# Patient Record
Sex: FEMALE | Race: BLACK OR AFRICAN AMERICAN | NOT HISPANIC OR LATINO | Employment: UNEMPLOYED | RURAL
[De-identification: names, ages, dates, MRNs, and addresses within clinical notes are randomized per-mention and may not be internally consistent; named-entity substitution may affect disease eponyms.]

---

## 2022-02-22 ENCOUNTER — OFFICE VISIT (OUTPATIENT)
Dept: FAMILY MEDICINE | Facility: CLINIC | Age: 37
End: 2022-02-22
Payer: MEDICARE

## 2022-02-22 VITALS
TEMPERATURE: 98 F | SYSTOLIC BLOOD PRESSURE: 140 MMHG | BODY MASS INDEX: 25.33 KG/M2 | HEIGHT: 69 IN | HEART RATE: 87 BPM | OXYGEN SATURATION: 99 % | DIASTOLIC BLOOD PRESSURE: 81 MMHG | WEIGHT: 171 LBS

## 2022-02-22 DIAGNOSIS — Z12.4 SCREENING FOR MALIGNANT NEOPLASM OF THE CERVIX: ICD-10-CM

## 2022-02-22 DIAGNOSIS — R10.9 ABDOMINAL PAIN, UNSPECIFIED ABDOMINAL LOCATION: ICD-10-CM

## 2022-02-22 DIAGNOSIS — Z01.411 ENCNTR FOR GYN EXAM (GENERAL) (ROUTINE) W ABNORMAL FINDINGS: Primary | ICD-10-CM

## 2022-02-22 DIAGNOSIS — N89.8 VAGINAL DISCHARGE: ICD-10-CM

## 2022-02-22 DIAGNOSIS — F32.A DEPRESSION, UNSPECIFIED DEPRESSION TYPE: ICD-10-CM

## 2022-02-22 LAB
BACTERIA VAG QL WET PREP: ABNORMAL /HPF
CLUE CELLS VAG QL WET PREP: ABNORMAL /HPF
HAV IGM SER QL: ABNORMAL
HBV CORE IGM SER QL: ABNORMAL
HBV SURFACE AG SERPL QL IA: REACTIVE
HCV AB SER QL: ABNORMAL
HIV 1+O+2 AB SERPL QL: NORMAL
RBC #/AREA VAG WET PREP: ABNORMAL /HPF
SQUAMOUS EPITHELIALS WET WOUNT, GENITAL: ABNORMAL /HPF
SYPHILIS AB INTERPRETATION: NORMAL
T VAGINALIS VAG QL WET PREP: ABNORMAL /HPF
WBC CLUMPS WET MOUNT, GENITAL: ABNORMAL /HPF
WBC VAG QL WET PREP: ABNORMAL /HPF
YEAST VAG QL WET PREP: ABNORMAL /HPF

## 2022-02-22 PROCEDURE — 87389 HIV 1 / 2 ANTIBODY: ICD-10-PCS | Mod: ,,, | Performed by: CLINICAL MEDICAL LABORATORY

## 2022-02-22 PROCEDURE — 87389 HIV-1 AG W/HIV-1&-2 AB AG IA: CPT | Mod: ,,, | Performed by: CLINICAL MEDICAL LABORATORY

## 2022-02-22 PROCEDURE — 80074 ACUTE HEPATITIS PANEL: CPT | Mod: ,,, | Performed by: CLINICAL MEDICAL LABORATORY

## 2022-02-22 PROCEDURE — G0101 CA SCREEN;PELVIC/BREAST EXAM: HCPCS | Mod: ,,, | Performed by: NURSE PRACTITIONER

## 2022-02-22 PROCEDURE — 87591 N.GONORRHOEAE DNA AMP PROB: CPT | Mod: ,,, | Performed by: CLINICAL MEDICAL LABORATORY

## 2022-02-22 PROCEDURE — 87661 TRICHOMONAS VAGINALIS BY PCR: ICD-10-PCS | Mod: ,,, | Performed by: CLINICAL MEDICAL LABORATORY

## 2022-02-22 PROCEDURE — 86695 HERPES SIMPLEX 1 & 2 IGG: ICD-10-PCS | Mod: ,,, | Performed by: CLINICAL MEDICAL LABORATORY

## 2022-02-22 PROCEDURE — G0123 SCREEN CERV/VAG THIN LAYER: HCPCS | Mod: GCY | Performed by: NURSE PRACTITIONER

## 2022-02-22 PROCEDURE — 87491 CHLAMYDIA/GONORRHOEAE(GC), PCR: ICD-10-PCS | Mod: ,,, | Performed by: CLINICAL MEDICAL LABORATORY

## 2022-02-22 PROCEDURE — 80074 HEPATITIS PANEL, ACUTE: ICD-10-PCS | Mod: ,,, | Performed by: CLINICAL MEDICAL LABORATORY

## 2022-02-22 PROCEDURE — 86696 HERPES SIMPLEX TYPE 2 TEST: CPT | Mod: ,,, | Performed by: CLINICAL MEDICAL LABORATORY

## 2022-02-22 PROCEDURE — 87661 TRICHOMONAS VAGINALIS AMPLIF: CPT | Mod: ,,, | Performed by: CLINICAL MEDICAL LABORATORY

## 2022-02-22 PROCEDURE — 96372 THER/PROPH/DIAG INJ SC/IM: CPT | Mod: ,,, | Performed by: NURSE PRACTITIONER

## 2022-02-22 PROCEDURE — 86780 TREPONEMA PALLIDUM (SYPHILIS) ANTIBODY: ICD-10-PCS | Mod: ,,, | Performed by: CLINICAL MEDICAL LABORATORY

## 2022-02-22 PROCEDURE — 87210 SMEAR WET MOUNT SALINE/INK: CPT | Mod: 59,,, | Performed by: CLINICAL MEDICAL LABORATORY

## 2022-02-22 PROCEDURE — 87491 CHLMYD TRACH DNA AMP PROBE: CPT | Mod: ,,, | Performed by: CLINICAL MEDICAL LABORATORY

## 2022-02-22 PROCEDURE — 96372 PR INJECTION,THERAP/PROPH/DIAG2ST, IM OR SUBCUT: ICD-10-PCS | Mod: ,,, | Performed by: NURSE PRACTITIONER

## 2022-02-22 PROCEDURE — 87591 CHLAMYDIA/GONORRHOEAE(GC), PCR: ICD-10-PCS | Mod: ,,, | Performed by: CLINICAL MEDICAL LABORATORY

## 2022-02-22 PROCEDURE — 87210 WET PREP, GENITAL: ICD-10-PCS | Mod: 59,,, | Performed by: CLINICAL MEDICAL LABORATORY

## 2022-02-22 PROCEDURE — G0101 PR CA SCREEN;PELVIC/BREAST EXAM: ICD-10-PCS | Mod: ,,, | Performed by: NURSE PRACTITIONER

## 2022-02-22 PROCEDURE — 86695 HERPES SIMPLEX TYPE 1 TEST: CPT | Mod: ,,, | Performed by: CLINICAL MEDICAL LABORATORY

## 2022-02-22 PROCEDURE — 86696 HERPES SIMPLEX 1 & 2 IGG: ICD-10-PCS | Mod: ,,, | Performed by: CLINICAL MEDICAL LABORATORY

## 2022-02-22 PROCEDURE — 86780 TREPONEMA PALLIDUM: CPT | Mod: ,,, | Performed by: CLINICAL MEDICAL LABORATORY

## 2022-02-22 RX ORDER — CEFTRIAXONE 1 G/1
1 INJECTION, POWDER, FOR SOLUTION INTRAMUSCULAR; INTRAVENOUS
Status: COMPLETED | OUTPATIENT
Start: 2022-02-22 | End: 2022-02-22

## 2022-02-22 RX ORDER — AZITHROMYCIN 500 MG/1
1000 TABLET, FILM COATED ORAL ONCE
Qty: 2 TABLET | Refills: 0 | Status: SHIPPED | OUTPATIENT
Start: 2022-02-22 | End: 2022-02-22

## 2022-02-22 RX ORDER — METRONIDAZOLE 500 MG/1
500 TABLET ORAL EVERY 12 HOURS
Qty: 14 TABLET | Refills: 0 | Status: SHIPPED | OUTPATIENT
Start: 2022-02-22 | End: 2022-03-09 | Stop reason: ALTCHOICE

## 2022-02-22 RX ORDER — ESCITALOPRAM OXALATE 10 MG/1
10 TABLET ORAL NIGHTLY
Qty: 30 TABLET | Refills: 5 | Status: SHIPPED | OUTPATIENT
Start: 2022-02-22 | End: 2022-03-09

## 2022-02-22 RX ADMIN — CEFTRIAXONE 1 G: 1 INJECTION, POWDER, FOR SOLUTION INTRAMUSCULAR; INTRAVENOUS at 09:02

## 2022-02-22 NOTE — PROGRESS NOTES
"Refused to answer remainder of questions      SUBJECTIVE:   36 y.o. female for annual routine Pap and checkup.    Vaginal discharge. approx 1 year since last intercourse   Pt appears upset and states going through stuff and doesn't want to talk about it right now.    No current outpatient medications on file.     No current facility-administered medications for this visit.     Allergies: Patient has no known allergies.   No LMP recorded.    ROS:  Feeling well. No dyspnea or chest pain on exertion.  No abdominal pain, change in bowel habits, black or bloody stools.  No urinary tract symptoms. GYN ROS: normal menses, no abnormal bleeding, pelvic pain + discharge, no breast pain or new or enlarging lumps on self exam. No neurological complaints.    OBJECTIVE:   The patient appears well, alert, oriented x 3, in no distress.  BP (!) 140/81   Pulse 87   Temp 98.1 °F (36.7 °C)   Ht 5' 9" (1.753 m)   Wt 77.6 kg (171 lb)   SpO2 99%   BMI 25.25 kg/m²   ENT normal.  Neck supple. No adenopathy or thyromegaly.   MICHELLE. Lungs are clear, good air entry, no wheezes, rhonchi or rales.   S1 and S2 normal, no murmurs, regular rate and rhythm.   Abdomen soft with tenderness, pt grimacing during exam, guarding, mass or organomegaly.   Extremities show no edema, normal peripheral pulses. Neurological is normal, no focal findings.    BREAST EXAM: breasts appear normal, no suspicious masses, no skin or nipple changes or axillary nodes    PELVIC EXAM: normal external genitalia, vulva, vagina, cervix, uterus and adnexa,   VULVA: normal appearing vulva with no masses, tenderness or lesions,   VAGINA: normal appearing vagina with normal color and + white thick discharge, no lesions,   CERVIX: normal appearing cervix without discharge or lesions,   UTERUS: uterus is normal size, shape, consistency and nontender,   ADNEXA: normal adnexa in size, nontender and no masses,   PAP: Pap smear done today, exam chaperoned by AUDELIA Muir " RN    ASSESSMENT:   well woman    PLAN:     pap smear  return annually or prn

## 2022-02-23 PROBLEM — Z01.411 ENCNTR FOR GYN EXAM (GENERAL) (ROUTINE) W ABNORMAL FINDINGS: Status: ACTIVE | Noted: 2022-02-23

## 2022-02-23 PROBLEM — F32.A DEPRESSION: Status: ACTIVE | Noted: 2022-02-23

## 2022-02-23 PROBLEM — N89.8 VAGINAL DISCHARGE: Status: ACTIVE | Noted: 2022-02-23

## 2022-02-23 PROBLEM — Z12.4 SCREENING FOR MALIGNANT NEOPLASM OF THE CERVIX: Status: ACTIVE | Noted: 2022-02-23

## 2022-02-23 PROBLEM — R10.9 ABDOMINAL PAIN: Status: ACTIVE | Noted: 2022-02-23

## 2022-02-23 LAB
CHLAMYDIA BY PCR: NEGATIVE
N. GONORRHOEAE (GC) BY PCR: NEGATIVE
TRICHOMONAS NAT: POSITIVE

## 2022-02-23 NOTE — PROGRESS NOTES
Make sure she takes flagyl see if shes feeling better. +trich and positive for hep b antigen. I would like her to repeat everthing 3 weeks and we will do more lab testing for the hepatitis to ensure accurate before sending to GI specialist to treat. Thanks - see back 3 weeks.

## 2022-02-24 LAB
HSV TYPE 1 AB IGG INDEX: >7.26
HSV TYPE 2 AB IGG INDEX: 0.47
HSV1 IGG SER QL: POSITIVE
HSV2 IGG SER QL: NEGATIVE

## 2022-02-25 LAB
GH SERPL-MCNC: NORMAL NG/ML
INSULIN SERPL-ACNC: NORMAL U[IU]/ML
LAB AP CLINICAL INFORMATION: NORMAL
LAB AP GYN INTERPRETATION: NEGATIVE
LAB AP PAP DISCLAIMER COMMENTS: NORMAL
RENIN PLAS-CCNC: NORMAL NG/ML/H

## 2022-03-09 ENCOUNTER — OFFICE VISIT (OUTPATIENT)
Dept: FAMILY MEDICINE | Facility: CLINIC | Age: 37
End: 2022-03-09
Payer: MEDICARE

## 2022-03-09 VITALS
DIASTOLIC BLOOD PRESSURE: 91 MMHG | SYSTOLIC BLOOD PRESSURE: 144 MMHG | HEIGHT: 58 IN | TEMPERATURE: 98 F | OXYGEN SATURATION: 100 % | HEART RATE: 64 BPM | WEIGHT: 172 LBS | BODY MASS INDEX: 36.11 KG/M2 | RESPIRATION RATE: 18 BRPM

## 2022-03-09 DIAGNOSIS — F32.A DEPRESSION, UNSPECIFIED DEPRESSION TYPE: ICD-10-CM

## 2022-03-09 DIAGNOSIS — N89.8 VAGINAL DISCHARGE: ICD-10-CM

## 2022-03-09 DIAGNOSIS — R73.9 HYPERGLYCEMIA: ICD-10-CM

## 2022-03-09 DIAGNOSIS — B18.1 CHRONIC VIRAL HEPATITIS B WITHOUT DELTA AGENT AND WITHOUT COMA: Primary | ICD-10-CM

## 2022-03-09 DIAGNOSIS — D17.21 LIPOMA OF RIGHT SHOULDER: ICD-10-CM

## 2022-03-09 DIAGNOSIS — Z13.6 ENCOUNTER FOR SCREENING FOR CARDIOVASCULAR DISORDERS: ICD-10-CM

## 2022-03-09 DIAGNOSIS — Z79.899 OTHER LONG TERM (CURRENT) DRUG THERAPY: ICD-10-CM

## 2022-03-09 DIAGNOSIS — R03.0 ELEVATED BLOOD PRESSURE READING: ICD-10-CM

## 2022-03-09 DIAGNOSIS — A59.9 TRICHIMONIASIS: ICD-10-CM

## 2022-03-09 PROBLEM — B18.9 CHRONIC VIRAL HEPATITIS: Status: ACTIVE | Noted: 2022-03-09

## 2022-03-09 LAB
25(OH)D3 SERPL-MCNC: 11.6 NG/ML
ALBUMIN SERPL BCP-MCNC: 3.7 G/DL (ref 3.5–5)
ALBUMIN/GLOB SERPL: 1 {RATIO}
ALP SERPL-CCNC: 61 U/L (ref 37–98)
ALT SERPL W P-5'-P-CCNC: 32 U/L (ref 13–56)
ANION GAP SERPL CALCULATED.3IONS-SCNC: 11 MMOL/L (ref 7–16)
AST SERPL W P-5'-P-CCNC: 18 U/L (ref 15–37)
BASOPHILS # BLD AUTO: 0.03 K/UL (ref 0–0.2)
BASOPHILS NFR BLD AUTO: 0.5 % (ref 0–1)
BILIRUB SERPL-MCNC: 0.5 MG/DL (ref 0–1.2)
BUN SERPL-MCNC: 12 MG/DL (ref 7–18)
BUN/CREAT SERPL: 14 (ref 6–20)
CALCIUM SERPL-MCNC: 8.8 MG/DL (ref 8.5–10.1)
CHLORIDE SERPL-SCNC: 109 MMOL/L (ref 98–107)
CHOLEST SERPL-MCNC: 120 MG/DL (ref 0–200)
CHOLEST/HDLC SERPL: 2.2 {RATIO}
CO2 SERPL-SCNC: 25 MMOL/L (ref 21–32)
CREAT SERPL-MCNC: 0.88 MG/DL (ref 0.55–1.02)
CREAT UR-MCNC: 198 MG/DL (ref 28–219)
DIFFERENTIAL METHOD BLD: ABNORMAL
EOSINOPHIL # BLD AUTO: 0.12 K/UL (ref 0–0.5)
EOSINOPHIL NFR BLD AUTO: 1.9 % (ref 1–4)
ERYTHROCYTE [DISTWIDTH] IN BLOOD BY AUTOMATED COUNT: 12.4 % (ref 11.5–14.5)
FOLATE SERPL-MCNC: 11.7 NG/ML (ref 3.1–17.5)
GLOBULIN SER-MCNC: 3.8 G/DL (ref 2–4)
GLUCOSE SERPL-MCNC: 98 MG/DL (ref 74–106)
HCT VFR BLD AUTO: 42.5 % (ref 38–47)
HDLC SERPL-MCNC: 54 MG/DL (ref 40–60)
HGB BLD-MCNC: 13.8 G/DL (ref 12–16)
IMM GRANULOCYTES # BLD AUTO: 0.01 K/UL (ref 0–0.04)
IMM GRANULOCYTES NFR BLD: 0.2 % (ref 0–0.4)
LDLC SERPL CALC-MCNC: 49 MG/DL
LDLC/HDLC SERPL: 0.9 {RATIO}
LYMPHOCYTES # BLD AUTO: 1.81 K/UL (ref 1–4.8)
LYMPHOCYTES NFR BLD AUTO: 29 % (ref 27–41)
MCH RBC QN AUTO: 34.1 PG (ref 27–31)
MCHC RBC AUTO-ENTMCNC: 32.5 G/DL (ref 32–36)
MCV RBC AUTO: 104.9 FL (ref 80–96)
MICROALBUMIN UR-MCNC: 5.1 MG/DL (ref 0–2.8)
MICROALBUMIN/CREAT RATIO PNL UR: 25.8 MG/G (ref 0–30)
MONOCYTES # BLD AUTO: 0.44 K/UL (ref 0–0.8)
MONOCYTES NFR BLD AUTO: 7 % (ref 2–6)
MPC BLD CALC-MCNC: 12.6 FL (ref 9.4–12.4)
NEUTROPHILS # BLD AUTO: 3.84 K/UL (ref 1.8–7.7)
NEUTROPHILS NFR BLD AUTO: 61.4 % (ref 53–65)
NONHDLC SERPL-MCNC: 66 MG/DL
NRBC # BLD AUTO: 0 X10E3/UL
NRBC, AUTO (.00): 0 %
PLATELET # BLD AUTO: 269 K/UL (ref 150–400)
POTASSIUM SERPL-SCNC: 4 MMOL/L (ref 3.5–5.1)
PROT SERPL-MCNC: 7.5 G/DL (ref 6.4–8.2)
RBC # BLD AUTO: 4.05 M/UL (ref 4.2–5.4)
SODIUM SERPL-SCNC: 141 MMOL/L (ref 136–145)
T4 FREE SERPL-MCNC: 0.96 NG/DL (ref 0.76–1.46)
TRIGL SERPL-MCNC: 84 MG/DL (ref 35–150)
TSH SERPL DL<=0.005 MIU/L-ACNC: 1.31 UIU/ML (ref 0.36–3.74)
VIT B12 SERPL-MCNC: 610 PG/ML (ref 193–986)
VLDLC SERPL-MCNC: 17 MG/DL
WBC # BLD AUTO: 6.25 K/UL (ref 4.5–11)

## 2022-03-09 PROCEDURE — 82570 MICROALBUMIN / CREATININE RATIO URINE: ICD-10-PCS | Mod: ,,, | Performed by: CLINICAL MEDICAL LABORATORY

## 2022-03-09 PROCEDURE — 82607 VITAMIN B-12: CPT | Mod: ,,, | Performed by: CLINICAL MEDICAL LABORATORY

## 2022-03-09 PROCEDURE — 85025 COMPLETE CBC W/AUTO DIFF WBC: CPT | Mod: ,,, | Performed by: CLINICAL MEDICAL LABORATORY

## 2022-03-09 PROCEDURE — 80061 LIPID PANEL: ICD-10-PCS | Mod: ,,, | Performed by: CLINICAL MEDICAL LABORATORY

## 2022-03-09 PROCEDURE — 99214 PR OFFICE/OUTPT VISIT, EST, LEVL IV, 30-39 MIN: ICD-10-PCS | Mod: ,,, | Performed by: NURSE PRACTITIONER

## 2022-03-09 PROCEDURE — 82306 VITAMIN D 25 HYDROXY: CPT | Mod: ,,, | Performed by: CLINICAL MEDICAL LABORATORY

## 2022-03-09 PROCEDURE — 82306 VITAMIN D: ICD-10-PCS | Mod: ,,, | Performed by: CLINICAL MEDICAL LABORATORY

## 2022-03-09 PROCEDURE — 84439 ASSAY OF FREE THYROXINE: CPT | Mod: ,,, | Performed by: CLINICAL MEDICAL LABORATORY

## 2022-03-09 PROCEDURE — 83036 HEMOGLOBIN A1C: ICD-10-PCS | Mod: ,,, | Performed by: CLINICAL MEDICAL LABORATORY

## 2022-03-09 PROCEDURE — 82746 ASSAY OF FOLIC ACID SERUM: CPT | Mod: ,,, | Performed by: CLINICAL MEDICAL LABORATORY

## 2022-03-09 PROCEDURE — 83036 HEMOGLOBIN GLYCOSYLATED A1C: CPT | Mod: ,,, | Performed by: CLINICAL MEDICAL LABORATORY

## 2022-03-09 PROCEDURE — 82043 MICROALBUMIN / CREATININE RATIO URINE: ICD-10-PCS | Mod: ,,, | Performed by: CLINICAL MEDICAL LABORATORY

## 2022-03-09 PROCEDURE — 99214 OFFICE O/P EST MOD 30 MIN: CPT | Mod: ,,, | Performed by: NURSE PRACTITIONER

## 2022-03-09 PROCEDURE — 82570 ASSAY OF URINE CREATININE: CPT | Mod: ,,, | Performed by: CLINICAL MEDICAL LABORATORY

## 2022-03-09 PROCEDURE — 85025 CBC WITH DIFFERENTIAL: ICD-10-PCS | Mod: ,,, | Performed by: CLINICAL MEDICAL LABORATORY

## 2022-03-09 PROCEDURE — 84443 ASSAY THYROID STIM HORMONE: CPT | Mod: ,,, | Performed by: CLINICAL MEDICAL LABORATORY

## 2022-03-09 PROCEDURE — 82043 UR ALBUMIN QUANTITATIVE: CPT | Mod: ,,, | Performed by: CLINICAL MEDICAL LABORATORY

## 2022-03-09 PROCEDURE — 86706 HEPATITIS B SURFACE ANTIBODY, QUAL/QUANT: ICD-10-PCS | Mod: 90,,, | Performed by: CLINICAL MEDICAL LABORATORY

## 2022-03-09 PROCEDURE — 80061 LIPID PANEL: CPT | Mod: ,,, | Performed by: CLINICAL MEDICAL LABORATORY

## 2022-03-09 PROCEDURE — 84439 THYROID PANEL: ICD-10-PCS | Mod: ,,, | Performed by: CLINICAL MEDICAL LABORATORY

## 2022-03-09 PROCEDURE — 80053 COMPREHEN METABOLIC PANEL: CPT | Mod: ,,, | Performed by: CLINICAL MEDICAL LABORATORY

## 2022-03-09 PROCEDURE — 86706 HEP B SURFACE ANTIBODY: CPT | Mod: 90,,, | Performed by: CLINICAL MEDICAL LABORATORY

## 2022-03-09 PROCEDURE — 82746 VITAMIN B12/FOLATE, SERUM PANEL: ICD-10-PCS | Mod: ,,, | Performed by: CLINICAL MEDICAL LABORATORY

## 2022-03-09 PROCEDURE — 82607 VITAMIN B12/FOLATE, SERUM PANEL: ICD-10-PCS | Mod: ,,, | Performed by: CLINICAL MEDICAL LABORATORY

## 2022-03-09 PROCEDURE — 80053 COMPREHENSIVE METABOLIC PANEL: ICD-10-PCS | Mod: ,,, | Performed by: CLINICAL MEDICAL LABORATORY

## 2022-03-09 PROCEDURE — 84443 THYROID PANEL: ICD-10-PCS | Mod: ,,, | Performed by: CLINICAL MEDICAL LABORATORY

## 2022-03-09 RX ORDER — ESCITALOPRAM OXALATE 20 MG/1
20 TABLET ORAL NIGHTLY
Qty: 90 TABLET | Refills: 3 | Status: SHIPPED | OUTPATIENT
Start: 2022-03-09 | End: 2023-03-09

## 2022-03-09 RX ORDER — LOSARTAN POTASSIUM 25 MG/1
25 TABLET ORAL DAILY
Qty: 90 TABLET | Refills: 3 | Status: SHIPPED | OUTPATIENT
Start: 2022-03-09 | End: 2023-03-09

## 2022-03-09 RX ORDER — DOXYCYCLINE 100 MG/1
100 CAPSULE ORAL 2 TIMES DAILY
Qty: 28 CAPSULE | Refills: 0 | Status: SHIPPED | OUTPATIENT
Start: 2022-03-09 | End: 2022-03-23
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
## 2022-03-10 LAB
EST. AVERAGE GLUCOSE BLD GHB EST-MCNC: 87 MG/DL
HBA1C MFR BLD HPLC: 5.2 % (ref 4.5–6.6)

## 2022-03-11 DIAGNOSIS — Z71.89 COMPLEX CARE COORDINATION: ICD-10-CM

## 2022-03-11 LAB
HBV SURFACE AB SER QL IA: NEGATIVE
HBV SURFACE AB SERPL IA-ACNC: <5 MIU/ML

## 2022-03-14 RX ORDER — ERGOCALCIFEROL 1.25 MG/1
50000 CAPSULE ORAL
COMMUNITY
End: 2022-03-14 | Stop reason: SDUPTHER

## 2022-03-15 RX ORDER — ERGOCALCIFEROL 1.25 MG/1
50000 CAPSULE ORAL
Qty: 4 CAPSULE | Refills: 5 | Status: SHIPPED | OUTPATIENT
Start: 2022-03-15

## 2022-03-15 NOTE — TELEPHONE ENCOUNTER
----- Message from Shanon Suresh sent at 3/15/2022  9:26 AM CDT -----  Regarding: Patient states she still doesn't have medication at [facility]  Martina said she was supposed to have medication at [facility] but they don't have anything.  I asked YUMIKO Perez earlier and she told me to send you a message about it. Patient is requesting  A call back: [phone]

## 2022-04-05 ENCOUNTER — OFFICE VISIT (OUTPATIENT)
Dept: SURGERY | Facility: CLINIC | Age: 37
End: 2022-04-05
Attending: SURGERY
Payer: MEDICARE

## 2022-04-05 DIAGNOSIS — D17.20 LIPOMA OF SHOULDER: Primary | ICD-10-CM

## 2022-04-05 PROCEDURE — 99204 PR OFFICE/OUTPT VISIT, NEW, LEVL IV, 45-59 MIN: ICD-10-PCS | Mod: S$PBB,,, | Performed by: SURGERY

## 2022-04-05 PROCEDURE — 99213 OFFICE O/P EST LOW 20 MIN: CPT | Mod: PBBFAC | Performed by: SURGERY

## 2022-04-05 PROCEDURE — 99204 OFFICE O/P NEW MOD 45 MIN: CPT | Mod: S$PBB,,, | Performed by: SURGERY

## 2022-04-05 NOTE — PROGRESS NOTES
"Subjective:       Patient ID: Martina Huntley is a 36 y.o. female.    Chief Complaint: Lump (Right shoulder)    This is a 36-year-old female patient who has a swelling/"knot" located over the posterior and cranial aspect of the right shoulder.  She reports that the knot has been growing larger over the past couple of years.  She has occasional discomfort, but is also concerned that this might be a malignancy.       family history includes Asthma in her mother; Down syndrome in her brother; No Known Problems in her brother, sister, and sister; Seizures in her father.  Past Medical History:   Diagnosis Date    Anxiety     Depression       History reviewed. No pertinent surgical history.    reports that she has been smoking cigarettes. She has never used smokeless tobacco. She reports current alcohol use.  Drug: Marijuana.     Review of Systems   All other systems reviewed and are negative.         Objective:      There were no vitals taken for this visit.   Physical Exam  Cardiovascular:      Rate and Rhythm: Normal rate.      Heart sounds: Normal heart sounds.   Pulmonary:      Effort: Pulmonary effort is normal.   Abdominal:      Palpations: Abdomen is soft.   Skin:     Comments: Palpable soft tissue mass roughly 9 cm in greatest dimension overlying the cephalad aspect and posterior aspect of the right shoulder   Neurological:      General: No focal deficit present.      Mental Status: She is alert.   Psychiatric:         Mood and Affect: Mood normal.           Assessment/Plan:         Lipoma of shoulder  -     CBC Auto Differential; Future; Expected date: 04/05/2022  -     EKG 12-lead; Future; Expected date: 04/05/2022  -     Basic Metabolic Panel; Future; Expected date: 04/05/2022  -     SARS Antigen (TAYA); Future; Expected date: 04/05/2022  -     Ambulatory Referral to External Surgery         Problem List Items Addressed This Visit        Oncology    Lipoma of shoulder - Primary    Overview     Increased in size " over past year or two           Current Assessment & Plan     Risks and benefits of removal include infection, bleeding, recurrence, hematoma seroma, and the likelihood of a depression/indentation of the skin at least short term postoperatively.  She expresses understanding wishes to proceed.           Relevant Orders    CBC Auto Differential    EKG 12-lead    Basic Metabolic Panel    SARS Antigen (TAYA)    Ambulatory Referral to External Surgery

## 2022-04-05 NOTE — PATIENT INSTRUCTIONS
[facility]  [address]  [place] [id number]      YOUR SURGERY DATE IS 4/11/22    LAB AND EKG IS SCHEDULED FOR 4/7/22. PLEASE SIGN IN ON 1ST FLOOR OF THE  [facility] FOR LAB.  EKG IS LOCATED ON 2ND FLOOR.      DO NOT EAT OR DRINK ANYTHING AFTER MIDNIGHT BEFORE YOU SURGERY    YOU MUST QUARANTINE FROM TIME OF COVID TESTING UNTIL SURGERY.    BRING ALL MEDICATIONS YOU ARE CURRENTLY TAKING WITH YOU.  IF YOU ARE TAKING  A BLOOD PRESSURE MEDICATION, TAKE YOUR BLOOD PRESSURE MEDICATION WITH A   SIP OF WATER WHEN YOU GET UP THE MORNING OF SURGERY.     YOU MUST PRE-ADMIT AT THE FOLLOWING WEBSITE:  WEBSITE: [email or web address]  PASSWORD: [email or web address]    A STAFF MEMBER FROM THE [facility] WILL CALL YOU THE DAY BEFORE  SURGERY TO LET YOU KNOW WHAT TIME TO ARRIVE THE MORNING OF SURGERY.  IF YOU HAVE NOT HEARD FROM THEM BY 4 P.M. THE DAY BEFORE YOUR SURGERY,   PLEASE CALL THEM [phone].      IF YOU HAVE ANY QUESTIONS YOU MAY CONTACT OUR OFFICE [phone].

## 2022-04-05 NOTE — ASSESSMENT & PLAN NOTE
Risks and benefits of removal include infection, bleeding, recurrence, hematoma seroma, and the likelihood of a depression/indentation of the skin at least short term postoperatively.  She expresses understanding wishes to proceed.

## 2022-04-11 ENCOUNTER — OUTSIDE PLACE OF SERVICE (OUTPATIENT)
Dept: ADMINISTRATIVE | Facility: HOSPITAL | Age: 37
End: 2022-04-11
Payer: MEDICARE

## 2022-04-11 ENCOUNTER — LAB REQUISITION (OUTPATIENT)
Dept: LAB | Facility: HOSPITAL | Age: 37
End: 2022-04-11
Attending: SURGERY
Payer: MEDICARE

## 2022-04-11 ENCOUNTER — TELEPHONE (OUTPATIENT)
Dept: SURGERY | Facility: HOSPITAL | Age: 37
End: 2022-04-11
Payer: MEDICARE

## 2022-04-11 DIAGNOSIS — D23.9 OTHER BENIGN NEOPLASM OF SKIN, UNSPECIFIED: ICD-10-CM

## 2022-04-11 DIAGNOSIS — D17.20 LIPOMA OF SHOULDER: Primary | ICD-10-CM

## 2022-04-11 PROCEDURE — 23071 PR EXCISION TUMOR SOFT TISSUE SHOULDER SUBQ 3+CM: ICD-10-PCS | Mod: RT,,, | Performed by: SURGERY

## 2022-04-11 PROCEDURE — 88291 CYTO/MOLECULAR REPORT: CPT

## 2022-04-11 PROCEDURE — 88274 CYTOGENETICS 25-99: CPT

## 2022-04-11 PROCEDURE — 88304 TISSUE EXAM BY PATHOLOGIST: CPT | Mod: SUR | Performed by: SURGERY

## 2022-04-11 PROCEDURE — 88304 TISSUE EXAM BY PATHOLOGIST: CPT | Mod: 26,,, | Performed by: PATHOLOGY

## 2022-04-11 PROCEDURE — 23071 EXC SHOULDER LES SC 3 CM/>: CPT | Mod: RT,,, | Performed by: SURGERY

## 2022-04-11 PROCEDURE — 88271 CYTOGENETICS DNA PROBE: CPT

## 2022-04-11 PROCEDURE — 88304 SURGICAL PATHOLOGY: ICD-10-PCS | Mod: 26,,, | Performed by: PATHOLOGY

## 2022-04-11 RX ORDER — HYDROCODONE BITARTRATE AND ACETAMINOPHEN 7.5; 325 MG/1; MG/1
1 TABLET ORAL EVERY 6 HOURS PRN
Qty: 20 TABLET | Refills: 0 | Status: SHIPPED | OUTPATIENT
Start: 2022-04-11

## 2022-04-11 NOTE — TELEPHONE ENCOUNTER
Patient called stating that the narcotic prescription was not sign and after her surgery today.  We will send in E-script for Norco.

## 2022-04-21 LAB
DHEA SERPL-MCNC: NORMAL
ESTRIOL SERPL-MCNC: NORMAL NG/ML
ESTROGEN SERPL-MCNC: NORMAL PG/ML
INSULIN SERPL-ACNC: NORMAL U[IU]/ML
LAB AP GROSS DESCRIPTION: NORMAL
LAB AP LABORATORY NOTES: NORMAL
T3RU NFR SERPL: NORMAL %

## 2022-06-13 PROBLEM — Z13.6 ENCOUNTER FOR SCREENING FOR CARDIOVASCULAR DISORDERS: Status: RESOLVED | Noted: 2022-02-23 | Resolved: 2022-06-13

## 2022-11-09 DIAGNOSIS — Z71.89 COMPLEX CARE COORDINATION: ICD-10-CM
